# Patient Record
Sex: MALE | Race: WHITE | NOT HISPANIC OR LATINO | Employment: FULL TIME | URBAN - METROPOLITAN AREA
[De-identification: names, ages, dates, MRNs, and addresses within clinical notes are randomized per-mention and may not be internally consistent; named-entity substitution may affect disease eponyms.]

---

## 2018-08-31 ENCOUNTER — HOSPITAL ENCOUNTER (EMERGENCY)
Facility: OTHER | Age: 48
Discharge: HOME | End: 2018-08-31
Attending: EMERGENCY MEDICINE
Payer: COMMERCIAL

## 2018-08-31 VITALS
RESPIRATION RATE: 18 BRPM | OXYGEN SATURATION: 95 % | TEMPERATURE: 99 F | HEART RATE: 90 BPM | DIASTOLIC BLOOD PRESSURE: 58 MMHG | HEIGHT: 70 IN | SYSTOLIC BLOOD PRESSURE: 104 MMHG | WEIGHT: 315 LBS | BODY MASS INDEX: 45.1 KG/M2

## 2018-08-31 DIAGNOSIS — S63.286A DISLOCATION OF PROXIMAL INTERPHALANGEAL JOINT OF RIGHT LITTLE FINGER, INITIAL ENCOUNTER: Primary | ICD-10-CM

## 2018-08-31 PROCEDURE — 26670 TREAT HAND DISLOCATION: CPT | Mod: RT

## 2018-08-31 PROCEDURE — 25000003 PHARM REV CODE 250: Performed by: EMERGENCY MEDICINE

## 2018-08-31 PROCEDURE — 99283 EMERGENCY DEPT VISIT LOW MDM: CPT | Mod: 25

## 2018-08-31 RX ORDER — HYDROCODONE BITARTRATE AND ACETAMINOPHEN 5; 325 MG/1; MG/1
1-2 TABLET ORAL EVERY 6 HOURS PRN
Qty: 16 TABLET | Refills: 0 | Status: SHIPPED | OUTPATIENT
Start: 2018-08-31 | End: 2018-09-10

## 2018-08-31 RX ORDER — IBUPROFEN 800 MG/1
800 TABLET ORAL EVERY 6 HOURS PRN
Qty: 30 TABLET | Refills: 0 | Status: SHIPPED | OUTPATIENT
Start: 2018-08-31

## 2018-08-31 RX ORDER — LISINOPRIL AND HYDROCHLOROTHIAZIDE 20; 25 MG/1; MG/1
1 TABLET ORAL DAILY
COMMUNITY

## 2018-08-31 RX ORDER — OXYCODONE AND ACETAMINOPHEN 5; 325 MG/1; MG/1
2 TABLET ORAL
Status: COMPLETED | OUTPATIENT
Start: 2018-08-31 | End: 2018-08-31

## 2018-08-31 RX ORDER — IBUPROFEN 400 MG/1
800 TABLET ORAL
Status: COMPLETED | OUTPATIENT
Start: 2018-08-31 | End: 2018-08-31

## 2018-08-31 RX ADMIN — IBUPROFEN 800 MG: 400 TABLET, FILM COATED ORAL at 09:08

## 2018-08-31 RX ADMIN — OXYCODONE AND ACETAMINOPHEN 2 TABLET: 5; 325 TABLET ORAL at 09:08

## 2018-09-01 NOTE — ED PROVIDER NOTES
"Encounter Date: 8/31/2018    SCRIBE #1 NOTE: I, Deshawn Rudd , am scribing for, and in the presence of, Dr. Díaz .       History     Chief Complaint   Patient presents with    Dislocation     R pinkie finger after fall     Time seen by provider: 9:15 PM    This is a 48 y.o. male who presents s/p fall that occurred approximately thirty minutes ago. Patient reports falling and dislocating his right "pinky" finger. He is currently endorsing constant, moderate pain to the finger. He states he has dislocated the same finger before, but it did not require surgery. He has not taken analgesics for the pain. He denies neck pain, back pain, headaches, or LOC. He has no additional complaints. Patient has a history of HTN and is compliant with medications. He is allergic to penicillin.         The history is provided by the patient.     Review of patient's allergies indicates:   Allergen Reactions    Penicillins Anaphylaxis     Past Medical History:   Diagnosis Date    Hypertension      Past Surgical History:   Procedure Laterality Date    NO PAST SURGERIES       History reviewed. No pertinent family history.  Social History     Tobacco Use    Smoking status: Never Smoker    Smokeless tobacco: Never Used   Substance Use Topics    Alcohol use: Yes    Drug use: No     Review of Systems   Constitutional: Negative for chills and fever.   HENT: Negative for congestion, rhinorrhea and sore throat.    Respiratory: Negative for cough and shortness of breath.    Cardiovascular: Negative for chest pain.   Gastrointestinal: Negative for abdominal pain, diarrhea, nausea and vomiting.   Genitourinary: Negative for decreased urine volume and dysuria.   Musculoskeletal: Negative for back pain.        Positive for right, pinky finger pain secondary to dislocation.    Skin: Negative for rash.   Neurological: Negative for dizziness, syncope, weakness and headaches.   Psychiatric/Behavioral: Negative for confusion.       Physical Exam "     Initial Vitals [08/31/18 2059]   BP Pulse Resp Temp SpO2   (!) 143/82 96 18 98.6 °F (37 °C) (!) 94 %      MAP       --         Physical Exam    Nursing note and vitals reviewed.  Constitutional: He appears well-developed and well-nourished. No distress.   Obese. Uncomfortable appearing.    HENT:   Head: Normocephalic and atraumatic.   Eyes: Conjunctivae and EOM are normal. Pupils are equal, round, and reactive to light.   Neck: Normal range of motion. Neck supple.   Musculoskeletal:   Right hand dominant. Deformities present at right, fifth PIP with dorsal displacement of middle phalanx. No bony crepitus. No tenderness at MCP or DIP. NVID.    Neurological: He is alert and oriented to person, place, and time. He has normal strength.   Skin: Skin is warm and dry.   Psychiatric: He has a normal mood and affect. His behavior is normal. Judgment and thought content normal.         ED Course   Orthopedic Injury  Date/Time: 8/31/2018 10:10 PM  Performed by: Navdeep Díaz II, MD  Authorized by: Navdeep Díaz II, MD     Consent Done?:  Not Needed  Injury:     Injury location:  Hand    Location details:  Right hand    Injury type:  Dislocation    Dislocation type: carpometacarpal (finger)      Dislocation type: carpometacarpal (finger)        Pre-procedure assessment:     Neurovascular status: Neurovascularly intact      Range of motion: reduced      Local anesthesia used?: No      Patient sedated?: No        Selections made in this section will also lock the Injury type section above.:     Manipulation performed?: Yes      Reduction successful?: Yes      Confirmation: Reduction confirmed by x-ray      Splint type: static finger splint.    Complications: No    Post-procedure assessment:     Neurovascular status: Neurovascularly intact      Range of motion: improved      Patient tolerance:  Patient tolerated the procedure well with no immediate complications      Labs Reviewed - No data to display       Imaging  Results          X-Ray Finger 2 or More Views Right (Final result)  Result time 08/31/18 22:27:53    Final result by Frankie Galicia MD (08/31/18 22:27:53)                 Impression:      See above.      Electronically signed by: Frankie Galicia MD  Date:    08/31/2018  Time:    22:27             Narrative:    EXAMINATION:  XR FINGER 2 OR MORE VIEWS RIGHT    CLINICAL HISTORY:  dislocation;    COMPARISON:  9:44 p.m..    FINDINGS:  Successful interval reduction of previously visualized 5th finger PIP dislocation.  No fracture seen.                               X-Ray Finger 2 or More Views Right (Final result)  Result time 08/31/18 21:53:42    Final result by Mango Mcgee MD (08/31/18 21:53:42)                 Impression:      Fifth middle phalanx dorsal dislocation.      Electronically signed by: Mango Mcgee MD  Date:    08/31/2018  Time:    21:53             Narrative:    EXAMINATION:  XR FINGER 2 OR MORE VIEWS RIGHT    CLINICAL HISTORY:  injury;    TECHNIQUE:  AP, lateral and oblique views of the right 5th digit    COMPARISON:  None    FINDINGS:  Bones are well mineralized.  There is dorsal dislocation of the 5th middle phalanx with respect to the PIP joint, with only minimal overlap/foreshortening.  No significant angulation.  No displaced fracture definitively seen.  Remaining osseous structures are intact.  No subcutaneous emphysema or radiodense retained foreign body.                              X-Rays:   Independently Interpreted Readings:   Other Readings:  Right fifth finger: Dorsal dislocation of mid phalanx of right, fifth digit. No obvious fracture seen.     Medical Decision Making:   Clinical Tests:   Radiological Study: Ordered and Reviewed  ED Management:  10:10 PM Closed reduction performed by traction and hyperextension. Remains NVID. Will obtain X-rays to confirm.             Scribe Attestation:   Scribe #1: I performed the above scribed service and the documentation accurately describes the  services I performed. I attest to the accuracy of the note.    Attending Attestation:           Physician Attestation for Scribe:  Physician Attestation Statement for Scribe #1: I, Dr. Díaz, reviewed documentation, as scribed by Deshawn Rudd  in my presence, and it is both accurate and complete.           Patient presents after a fall, resulting in deformity of right 5th digit.  He reports dislocated this back in high school.  No other injury complaint today.  On exam suggests dorsal dislocation at 5th PIP joint.  X-ray confirmed this, no fracture.  Given oral analgesics and reduction then performed patient tolerated well repeat chest x-ray also reviewed by radiologist.  No fracture.  Placed in splint.  Encouraged follow-up with Orthopedics/hands.  Stable for discharge             Clinical Impression:     1. Dislocation of proximal interphalangeal joint of right little finger, initial encounter                                   Navdeep Díaz II, MD  08/31/18 [id number]

## 2018-09-01 NOTE — ED TRIAGE NOTES
"Pt presents with "pinky dislocation", onset 20 min PTA. Pt reports mechanical fall and pt fell forward. Pt states his right side hit the ground, denies head injury, no bruising or edema noted to right face. Pt has ice on hand currently, pt denies taking any pain medications PTA. PMH of prior dislocation to affected finger. Active ROM to MC joint, decreased passive ROM to DIP, no ROM to PIP. No bruising noted. Pt is well appearing, pt in NAD  "